# Patient Record
Sex: MALE | Race: BLACK OR AFRICAN AMERICAN | Employment: UNEMPLOYED | URBAN - METROPOLITAN AREA
[De-identification: names, ages, dates, MRNs, and addresses within clinical notes are randomized per-mention and may not be internally consistent; named-entity substitution may affect disease eponyms.]

---

## 2018-03-26 ENCOUNTER — OFFICE VISIT (OUTPATIENT)
Dept: INTERNAL MEDICINE CLINIC | Age: 23
End: 2018-03-26

## 2018-03-26 VITALS
HEIGHT: 69 IN | HEART RATE: 58 BPM | BODY MASS INDEX: 29.98 KG/M2 | SYSTOLIC BLOOD PRESSURE: 125 MMHG | DIASTOLIC BLOOD PRESSURE: 72 MMHG | OXYGEN SATURATION: 98 % | WEIGHT: 202.38 LBS | TEMPERATURE: 99.6 F | RESPIRATION RATE: 18 BRPM

## 2018-03-26 DIAGNOSIS — L85.3 XEROSIS OF SKIN: ICD-10-CM

## 2018-03-26 DIAGNOSIS — Z00.00 PREVENTATIVE HEALTH CARE: Primary | ICD-10-CM

## 2018-03-26 RX ORDER — TRIAMCINOLONE ACETONIDE 0.25 MG/G
CREAM TOPICAL 2 TIMES DAILY
Qty: 30 G | Refills: 0 | Status: SHIPPED | OUTPATIENT
Start: 2018-03-26 | End: 2018-05-08 | Stop reason: ALTCHOICE

## 2018-03-26 RX ORDER — BISMUTH SUBSALICYLATE 262 MG
1 TABLET,CHEWABLE ORAL DAILY
COMMUNITY

## 2018-03-26 NOTE — LETTER
NOTIFICATION RETURN TO WORK / SCHOOL 
 
3/26/2018 2:04 PM 
 
Mr. Mariajose Cook [address] [address] To Whom It May Concern: 
 
Mariajose Cook is currently under the care of [address] [place] [address],[address]. He was seen today in the office. If there are questions or concerns please have the patient contact our office. Sincerely, Cj Arriaga MD

## 2018-03-26 NOTE — PROGRESS NOTES
HISTORY OF PRESENT ILLNESS  Fabio Fang is a 23 y.o. male. HPI  new to my practice  Transferring care from Dr. Bertrand Barnes. Last evaluated there 2  years ago. Previous medical records are not available for my review at this visit. Fabio Fang is here for complete health maintenance physical exam and screening. he does have other concerns. He reports over 1 year history of waxing/ waning distal penile shaft dry sometimes pruritic skin. No blisters, ulcers, redness, drainage. No discharge. History   Sexual Activity    Sexual activity: Yes    Partners: Female         Health maintenance hx includes:  Exercise: very active. 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 Immunization status: up to date and documented. Social History     Social History    Marital status: UNKNOWN     Spouse name: N/A    Number of children: N/A    Years of education: N/A     Occupational History    Not on file.      Social History Main Topics    Smoking status: Never Smoker    Smokeless tobacco: Never Used    Alcohol use Yes      Comment: 0-1 drinks a month    Drug use: 1.00 per week     Special: Marijuana      Comment: 1-2  a week     Sexual activity: Yes     Partners: Female     Other Topics Concern    Not on file     Social History Narrative    No narrative on file     Past Surgical History:   Procedure Laterality Date    HX ORTHOPAEDIC Left 2016    Left Shoulder - labrum     Family History   Problem Relation Age of Onset    Hypertension Father     Hypertension Brother     Diabetes Paternal Grandmother    [employer] Paternal Aunt      gallbladder    Cancer Maternal Grandfather      No current outpatient prescriptions on file prior to visit. No current facility-administered medications on file prior to visit. .    Review of Systems   Constitutional: Negative for malaise/fatigue and weight loss. Eyes: Negative for blurred vision and pain. Respiratory: Negative for cough, shortness of breath and wheezing. Cardiovascular: Negative for chest pain, palpitations and leg swelling. Gastrointestinal: Negative for abdominal pain, constipation, diarrhea, heartburn, nausea and vomiting. Genitourinary: Negative. As above     Musculoskeletal: Negative for back pain, joint pain and myalgias. Skin: Negative for rash. Neurological: Negative for dizziness and headaches. Endo/Heme/Allergies: Negative for environmental allergies. Psychiatric/Behavioral: Positive for substance abuse (he occasionally uses THC). Negative for depression. The patient is not nervous/anxious and does not have insomnia. Physical Exam   Constitutional: He is oriented to person, place, and time. He appears well-developed and well-nourished. No distress. Body mass index is 29.89 kg/(m^2). /72 (BP 1 Location: Left arm, BP Patient Position: Sitting)  Pulse (!) 58  Temp 99.6 °F (37.6 °C) (Oral)   Resp 18  Ht 5' 9\" (1.753 m)  Wt 202 lb 6 oz (91.8 kg)  SpO2 98%  BMI 29.89 kg/m2   HENT:   Head: Normocephalic and atraumatic. Right Ear: Hearing normal.   Left Ear: Hearing normal.   Nose: Nose normal.   Mouth/Throat: Oropharynx is clear and moist and mucous membranes are normal. Normal dentition. Eyes: Conjunctivae and lids are normal. Pupils are equal, round, and reactive to light. Right eye exhibits no discharge. Left eye exhibits no discharge. No scleral icterus. Neck: Trachea normal. No thyromegaly present.    Cardiovascular: Normal rate, regular rhythm, normal heart sounds, intact distal pulses and normal pulses. Exam reveals no gallop and no friction rub. No murmur heard. Pulmonary/Chest: Effort normal and breath sounds normal. No respiratory distress. Abdominal: Soft. Normal appearance and bowel sounds are normal. He exhibits no distension and no mass. There is no hepatosplenomegaly. There is no tenderness. There is no CVA tenderness. Genitourinary:       Circumcised. No penile erythema or penile tenderness. No discharge found. Genitourinary Comments: Mild dorsal penile dryness over shaft. No vesicles, ulcerations, redness, drainage. Musculoskeletal: Normal range of motion. He exhibits no edema or tenderness. Lymphadenopathy:     He has no cervical adenopathy. Right: No inguinal adenopathy present. Left: No inguinal adenopathy present. Neurological: He is alert and oriented to person, place, and time. Skin: Skin is warm and dry. No rash noted. He is not diaphoretic. Psychiatric: He has a normal mood and affect. His speech is normal and behavior is normal. Judgment and thought content normal. Cognition and memory are normal.   Nursing note and vitals reviewed. ASSESSMENT and PLAN  Diagnoses and all orders for this visit:    1. Preventative health care  Marissa  was counseled on age-appropriate/ guideline-based risk prevention behaviors and screening for a 23y.o. year old   male . We also discussed adjustments in screening based on family history if necessary. Printed instructions for preventative screening guidelines and healthy behaviors given to patient with after visit summary. 2. Xerosis of skin  -     triamcinolone acetonide (KENALOG) 0.025 % topical cream; Apply  to affected area two (2) times a day. use thin layer      Follow-up Disposition:  Return in about 1 year (around 3/26/2019).

## 2018-03-26 NOTE — MR AVS SNAPSHOT
[facility] 
 
 
 [address] [place] [address] [facility] 
[phone] Patient: Luis Ennis MRN: [id number] :1995 Visit Information Date & Time Provider Department Dept. Phone Encounter #  
 3/26/2018  1:30 PM Sunni Alegria MD [facility] [address] [facility] [id number] Follow-up Instructions Return in about 1 year (around 3/26/2019). Upcoming Health Maintenance Date Due Hepatitis A Peds Age 1-18 (1 of 2 - Standard Series) 1998 DTaP/Tdap/Td series (1 - Tdap) 2004 HPV AGE 9Y-34Y (1 of 3 - Male 3 Dose Series) 2008 Influenza Age 5 to Adult 2017 Allergies as of 3/26/2018  Review Complete On: 3/26/2018 By: Sunni Alegria MD  
 No Known Allergies Current Immunizations  Reviewed on 3/26/2018 No immunizations on file. Reviewed by Sunni Alegria MD on 3/26/2018 at  1:51 PM  
You Were Diagnosed With   
  
 Codes Comments Preventative health care    -  Primary ICD-10-CM: Z00.00 ICD-9-CM: V70.0 Xerosis of skin     ICD-10-CM: L85.3 ICD-9-CM: 706.8 Vitals BP Pulse Temp Resp Height(growth percentile) Weight(growth percentile) 125/72 (BP 1 Location: Left arm, BP Patient Position: Sitting) (!) 58 99.6 °F (37.6 °C) (Oral) 18 5' 9\" (1.753 m) 202 lb 6 oz (91.8 kg) SpO2 BMI Smoking Status 98% 29.89 kg/m2 Never Smoker Vitals History BMI and BSA Data Body Mass Index Body Surface Area  
 29.89 kg/m 2 2.11 m 2 Preferred Pharmacy Pharmacy Name Phone [facility]/PHARMACY #[id number]- [address] [facility] AT [place] [address] [phone] Your Updated Medication List  
  
   
This list is accurate as of 3/26/18  2:04 PM.  Always use your most recent med list.  
  
  
  
  
 multivitamin tablet Commonly known as:  ONE A DAY Take 1 Tab by mouth daily. triamcinolone acetonide 0.025 % topical cream  
Commonly known as:  KENALOG Apply  to affected area two (2) times a day. use thin layer Prescriptions Sent to Pharmacy Refills  
 triamcinolone acetonide (KENALOG) 0.025 % topical cream 0 Sig: Apply  to affected area two (2) times a day. use thin layer Class: Normal  
 Pharmacy: [facility]/pharmacy #[id number]- [place], [address] [facility] #: [phone] Route: Topical  
  
Follow-up Instructions Return in about 1 year (around 3/26/2019). Patient Instructions Well Visit, Ages 25 to 48: Care Instructions Your Care Instructions Physical exams can help you stay healthy. Your doctor has checked your overall health and may have suggested ways to take good care of yourself. He or she also may have recommended tests. At home, you can help prevent illness with healthy eating, regular exercise, and other steps. Follow-up care is a key part of your treatment and safety. Be sure to make and go to all appointments, and call your doctor if you are having problems. It's also a good idea to know your test results and keep a list of the medicines you take. How can you care for yourself at home? · Reach and stay at a healthy weight. This will lower your risk for many problems, such as obesity, diabetes, heart disease, and high blood pressure. · Get at least 30 minutes of physical activity on most days of the week. Walking is a good choice. You also may want to do other activities, such as running, swimming, cycling, or playing tennis or team sports. Discuss any changes in your exercise program with your doctor. · Do not smoke or allow others to smoke around you. If you need help quitting, talk to your doctor about stop-smoking programs and medicines. These can increase your chances of quitting for good.  
· Talk to your doctor about whether you have any risk factors for sexually transmitted infections (STIs). Having one sex partner (who does not have STIs and does not have sex with anyone else) is a good way to avoid these infections. · Use birth control if you do not want to have children at this time. Talk with your doctor about the choices available and what might be best for you. · Protect your skin from too much sun. When you're outdoors from 10 a.m. to 4 p.m., stay in the shade or cover up with clothing and a hat with a wide brim. Wear sunglasses that block UV rays. Even when it's cloudy, put broad-spectrum sunscreen (SPF 30 or higher) on any exposed skin. · See a dentist one or two times a year for checkups and to have your teeth cleaned. · Wear a seat belt in the car. · Drink alcohol in moderation, if at all. That means no more than 2 drinks a day for men and 1 drink a day for women. Follow your doctor's advice about when to have certain tests. These tests can spot problems early. For everyone · Cholesterol. Have the fat (cholesterol) in your blood tested after age 21. Your doctor will tell you how often to have this done based on your age, family history, or other things that can increase your risk for heart disease. · Blood pressure. Have your blood pressure checked during a routine doctor visit. Your doctor will tell you how often to check your blood pressure based on your age, your blood pressure results, and other factors. · Vision. Talk with your doctor about how often to have a glaucoma test. 
· Diabetes. Ask your doctor whether you should have tests for diabetes. · Colon cancer. Have a test for colon cancer at age 48. You may have one of several tests. If you are younger than 48, you may need a test earlier if you have any risk factors. Risk factors include whether you already had a precancerous polyp removed from your colon or whether your parent, brother, sister, or child has had colon cancer. For women · Breast exam and mammogram. Talk to your doctor about when you should have a clinical breast exam and a mammogram. Medical experts differ on whether and how often women under 50 should have these tests. Your doctor can help you decide what is right for you. · Pap test and pelvic exam. Begin Pap tests at age 24. A Pap test is the best way to find cervical cancer. The test often is part of a pelvic exam. Ask how often to have this test. 
· Tests for sexually transmitted infections (STIs). Ask whether you should have tests for STIs. You may be at risk if you have sex with more than one person, especially if your partners do not wear condoms. For men · Tests for sexually transmitted infections (STIs). Ask whether you should have tests for STIs. You may be at risk if you have sex with more than one person, especially if you do not wear a condom. · Testicular cancer exam. Ask your doctor whether you should check your testicles regularly. · Prostate exam. Talk to your doctor about whether you should have a blood test (called a PSA test) for prostate cancer. Experts differ on whether and when men should have this test. Some experts suggest it if you are older than 39 and are -American or have a father or brother who got prostate cancer when he was younger than 72. When should you call for help? Watch closely for changes in your health, and be sure to contact your doctor if you have any problems or symptoms that concern you. Where can you learn more? Go to http://renetta-marco.info/. Enter P072 in the search box to learn more about \"Well Visit, Ages 25 to 48: Care Instructions. \" Current as of: May 12, 2017 Content Version: 11.4 © 6017-0848 Healthwise, Incorporated. Care instructions adapted under license by [employer] (which disclaims liability or warranty for this information).  If you have questions about a medical condition or this instruction, always ask your healthcare professional. [address]  any warranty or liability for your use of this information. Introducing [facility]! [facility] introduces [employer] patient portal. Now you can access parts of your medical record, email your doctor's office, and request medication refills online. 1. In your internet browser, go to [email or web address] 2. Click on the First Time User? Click Here link in the Sign In box. You will see the New Member Sign Up page. 3. Enter your [employer] Access Code exactly as it appears below. You will not need to use this code after youve completed the sign-up process. If you do not sign up before the expiration date, you must request a new code. · [employer] Access Code: [email or web address] 
Expires: 6/24/2018  2:04 PM 
 
4. Enter the last four digits of your Social Security Number (xxxx) and Date of Birth (mm/dd/yyyy) as indicated and click Submit. You will be taken to the next sign-up page. 5. Create a [employer] ID. This will be your [employer] login ID and cannot be changed, so think of one that is secure and easy to remember. 6. Create a [employer] password. You can change your password at any time. 7. Enter your Password Reset Question and Answer. This can be used at a later time if you forget your password. 8. Enter your e-mail address. You will receive e-mail notification when new information is available in [address]. 9. Click Sign Up. You can now view and download portions of your medical record. 10. Click the Download Summary menu link to download a portable copy of your medical information. If you have questions, please visit the Frequently Asked Questions section of the [employer] website. Remember, [employer] is NOT to be used for urgent needs. For medical emergencies, dial 911. Now available from your iPhone and Android! Please provide this summary of care documentation to your next provider. Your primary care clinician is listed as NONE. If you have any questions after today's visit, please call [phone].

## 2018-03-26 NOTE — PATIENT INSTRUCTIONS

## 2018-04-03 ENCOUNTER — DOCUMENTATION ONLY (OUTPATIENT)
Dept: INTERNAL MEDICINE CLINIC | Age: 23
End: 2018-04-03

## 2018-05-08 ENCOUNTER — OFFICE VISIT (OUTPATIENT)
Dept: INTERNAL MEDICINE CLINIC | Age: 23
End: 2018-05-08

## 2018-05-08 VITALS
OXYGEN SATURATION: 99 % | TEMPERATURE: 98.5 F | RESPIRATION RATE: 18 BRPM | HEIGHT: 69 IN | HEART RATE: 77 BPM | WEIGHT: 201.5 LBS | SYSTOLIC BLOOD PRESSURE: 141 MMHG | DIASTOLIC BLOOD PRESSURE: 75 MMHG | BODY MASS INDEX: 29.84 KG/M2

## 2018-05-08 DIAGNOSIS — K64.9 HEMORRHOIDS, UNSPECIFIED HEMORRHOID TYPE: ICD-10-CM

## 2018-05-08 RX ORDER — TRIAMCINOLONE ACETONIDE 0.25 MG/G
CREAM TOPICAL 2 TIMES DAILY
Qty: 30 G | Refills: 0
Start: 2018-05-08

## 2018-05-08 NOTE — PROGRESS NOTES
HISTORY OF PRESENT ILLNESS  Gray Blair is a 23 y.o. male. HPI  Gastrointestinal History:     he has complaint of  diarrhea without abdominal pain, cramping, vomiting, fever, chills, sweats and arthralgias for 3 days starting 4 days ago. Now constipated over last 24 hours. Abdominal pain is located in none. Orthostatic dizziness is not  present. Contacts with similar GI infections:  No  Medications tried:  none   he is able to get liquids down. Also having some anal discomfort, swelling. Trace blood on TP  . There is no problem list on file for this patient. No past medical history on file. No Known Allergies  Current Outpatient Prescriptions on File Prior to Visit   Medication Sig Dispense Refill    multivitamin (ONE A DAY) tablet Take 1 Tab by mouth daily. No current facility-administered medications on file prior to visit. Social History   Substance Use Topics    Smoking status: Never Smoker    Smokeless tobacco: Never Used    Alcohol use Yes      Comment: 0-1 drinks a month         ROS    Physical Exam   Constitutional: He appears well-developed and well-nourished. No distress. /75 (BP 1 Location: Left arm, BP Patient Position: Sitting)  Pulse 77  Temp 98.5 °F (36.9 °C) (Oral)   Resp 18  Ht 5' 9\" (1.753 m)  Wt 201 lb 8 oz (91.4 kg)  SpO2 99%  BMI 29.76 kg/m2Body mass index is 29.76 kg/(m^2). HENT:   Mouth/Throat: Oropharynx is clear and moist.   Eyes: No scleral icterus. Neck: No JVD present. Carotid bruit is not present. Cardiovascular: Normal rate, regular rhythm, normal heart sounds and intact distal pulses. Pulmonary/Chest: Effort normal and breath sounds normal.   Abdominal: Soft. Normal appearance and bowel sounds are normal. He exhibits no distension and no mass. There is no hepatosplenomegaly. There is no tenderness. There is no rigidity and no guarding. Genitourinary:         Genitourinary Comments: 1 cm soft ext hemorrhoid, mildly tender. Nonbleeding. Musculoskeletal: He exhibits no edema. Neurological: He is alert. Skin: Skin is warm and dry. He is not diaphoretic. Nursing note and vitals reviewed. ASSESSMENT and PLAN  Diagnoses and all orders for this visit:    1. Food poisoning, accidental or unintentional, initial encounter -already improved  [employer]. The patient was given written instructions detailing his diagnoses and recommendations made today at the visit. 2. Hemorrhoids, unspecified hemorrhoid type  -     triamcinolone acetonide (KENALOG) 0.025 % topical cream; Apply  to affected area two (2) times a day. use thin layer  The patient was given written instructions detailing his diagnoses and recommendations made today at the visit. Follow-up Disposition:  Return if symptoms worsen or fail to improve.

## 2018-05-08 NOTE — PATIENT INSTRUCTIONS
Hemorrhoids: Care Instructions  Your Care Instructions    Hemorrhoids are enlarged veins that develop in the anal canal. Bleeding during bowel movements, itching, swelling, and rectal pain are the most common symptoms. They can be uncomfortable at times, but hemorrhoids rarely are a serious problem. You can treat most hemorrhoids with simple changes to your diet and bowel habits. These changes include eating more fiber and not straining to pass stools. Most hemorrhoids do not need surgery or other treatment unless they are very large and painful or bleed a lot. Follow-up care is a key part of your treatment and safety. Be sure to make and go to all appointments, and call your doctor if you are having problems. It's also a good idea to know your test results and keep a list of the medicines you take. How can you care for yourself at home? · Sit in a few inches of warm water (sitz bath) 3 times a day and after bowel movements. The warm water helps with pain and itching. · Put ice on your anal area several times a day for 10 minutes at a time. Put a thin cloth between the ice and your skin. Follow this by placing a warm, wet towel on the area for another 10 to 20 minutes. · Take pain medicines exactly as directed. ¨ If the doctor gave you a prescription medicine for pain, take it as prescribed. ¨ If you are not taking a prescription pain medicine, ask your doctor if you can take an over-the-counter medicine. · Keep the anal area clean, but be gentle. Use water and a fragrance-free soap, such as [place], or use baby wipes or medicated pads, such as Tucks. · Wear cotton underwear and loose clothing to decrease moisture in the anal area. · Eat more fiber. Include foods such as whole-grain breads and cereals, raw vegetables, raw and dried fruits, and beans. · Drink plenty of fluids, enough so that your urine is light yellow or clear like water.  If you have kidney, heart, or liver disease and have to limit fluids, talk with your doctor before you increase the amount of fluids you drink. · Use a stool softener that contains bran or psyllium. You can save money by buying bran or psyllium (available in bulk at most health food stores) and sprinkling it on foods or stirring it into fruit juice. Or you can use a product such as Metamucil or Hydrocil. · Practice healthy bowel habits. ¨ Go to the bathroom as soon as you have the urge. ¨ Avoid straining to pass stools. Relax and give yourself time to let things happen naturally. ¨ Do not hold your breath while passing stools. ¨ Do not read while sitting on the toilet. Get off the toilet as soon as you have finished. · Take your medicines exactly as prescribed. Call your doctor if you think you are having a problem with your medicine. When should you call for help? Call 911 anytime you think you may need emergency care. For example, call if:  ? · You pass maroon or very bloody stools. ?Call your doctor now or seek immediate medical care if:  ? · You have increased pain. ? · You have increased bleeding. ? Watch closely for changes in your health, and be sure to contact your doctor if:  ? · Your symptoms have not improved after 3 or 4 days. Where can you learn more? Go to http://renetta-marco.info/. Enter F228 in the search box to learn more about \"Hemorrhoids: Care Instructions. \"  Current as of: May 12, 2017  Content Version: 11.4  © 9886-2904 [employer]. Care instructions adapted under license by [employer] (which disclaims liability or warranty for this information). If you have questions about a medical condition or this instruction, always ask your healthcare professional. [place] [address] any warranty or liability for your use of this information. Food Poisoning: Care Instructions  Your Care Instructions    Food poisoning occurs when you eat foods that contain harmful germs.  Food can be contaminated while it is growing, during processing, or when it is prepared. Fresh fruits and vegetables also can be contaminated if they are washed in contaminated water. You may have become ill after eating undercooked meat or eggs or other unsafe foods. Cooking foods thoroughly and storing them properly can help prevent food poisoning. There are many types of food poisoning. Your symptoms depend on the type of food poisoning you have. You will probably begin to feel better in 1 or 2 days. In the meantime, get plenty of rest and make sure that you do not become dehydrated. Follow-up care is a key part of your treatment and safety. Be sure to make and go to all appointments, and call your doctor if you are having problems. It's also a good idea to know your test results and keep a list of the medicines you take. How can you care for yourself at home? · To prevent dehydration, drink plenty of fluids. Choose water and other caffeine-free clear liquids until you feel better. If you have kidney, heart, or liver disease and have to limit fluids, talk with your doctor before you increase the amount of fluids you drink. · Begin eating small amounts of mild, low-fat foods, depending on how you feel. Try foods like rice, dry crackers, bananas, and applesauce. ¨ Avoid spicy foods, alcohol, and coffee until 48 hours after all symptoms have disappeared. ¨ Avoid chewing gum that contains sorbitol. ¨ Avoid dairy products for 3 days after symptoms disappear. · Take your medicines as prescribed. Call your doctor if you think you are having a problem with your medicine. You will get more details on the specific medicines your doctor prescribes. To prevent food poisoning  · Keep hot foods hot and cold foods cold. · Do not eat meats, dressings, salads, or other foods that have been kept at room temperature for more than 2 hours. · Use a thermometer to check your refrigerator.  It should be between 34°F and 40°F.  · Defrost meats in the refrigerator or microwave, not on the kitchen counter. · Keep your hands and your kitchen clean. Wash your hands, cutting boards, and countertops with hot, soapy water. If you use the same cutting board for chopping vegetables and preparing raw meat, be sure to wash the cutting board with hot, soapy water between each use. · Cook meat until it is well done. · Do not eat raw eggs or uncooked dough or sauces made with raw eggs. · Do not take chances. If you think food looks or tastes spoiled, throw it out. When should you call for help? Call 911 anytime you think you may need emergency care. For example, call if:  ? · You passed out (lost consciousness). ?Call your doctor now or seek immediate medical care if:  ? · You have new or worse belly pain. ? · You have a new or higher fever. ? · You are dizzy or lightheaded, or you feel like you may faint. ? · You have symptoms of dehydration, such as:  ¨ Dry eyes and a dry mouth. ¨ Passing only a little urine. ¨ Feeling thirstier than normal.   ? · You cannot keep down medicine or fluids. ? · You have new or more blood in stools. ? · You have new or worse vomiting or diarrhea. ? Watch closely for changes in your health, and be sure to contact your doctor if:  ? · You do not get better as expected. Where can you learn more? Go to http://renetta-marco.info/. Enter B338 in the search box to learn more about \"Food Poisoning: Care Instructions. \"  Current as of: March 3, 2017  Content Version: 11.4  © 0181-3395 [employer]. Care instructions adapted under license by [employer] (which disclaims liability or warranty for this information). If you have questions about a medical condition or this instruction, always ask your healthcare professional. [address] any warranty or liability for your use of this information.

## 2018-05-08 NOTE — MR AVS SNAPSHOT
[facility] 
 
 
 [address] [place] [address] [facility] 
[phone] Patient: Nohemi Zhang MRN: [id number] :1995 Visit Information Date & Time Provider Department Dept. Phone Encounter #  
 2018  2:15 PM Marycarmen Ng MD [facility] [address] [id number] Follow-up Instructions Return if symptoms worsen or fail to improve. Upcoming Health Maintenance Date Due Hepatitis A Peds Age 1-18 (1 of 2 - Standard Series) 1998 DTaP/Tdap/Td series (1 - Tdap) 2004 HPV Age 9Y-34Y (1 of 1 - Male 3 Dose Series) 2008 Influenza Age 5 to Adult 2018 Allergies as of 2018  Review Complete On: 3/26/2018 By: Marycarmen Ng MD  
 No Known Allergies Current Immunizations  Reviewed on 3/26/2018 No immunizations on file. Not reviewed this visit You Were Diagnosed With   
  
 Codes Comments Food poisoning, accidental or unintentional, initial encounter    -  Primary ICD-10-CM: T62.91XA ICD-9-CM: 988.9, E865.9 Hemorrhoids, unspecified hemorrhoid type     ICD-10-CM: K64.9 ICD-9-CM: 732. 6 Vitals BP Pulse Temp Resp Height(growth percentile) Weight(growth percentile) 141/75 (BP 1 Location: Left arm, BP Patient Position: Sitting) 77 98.5 °F (36.9 °C) (Oral) 18 5' 9\" (1.753 m) 201 lb 8 oz (91.4 kg) SpO2 BMI Smoking Status 99% 29.76 kg/m2 Never Smoker Vitals History BMI and BSA Data Body Mass Index Body Surface Area  
 29.76 kg/m 2 2.11 m 2 Preferred Pharmacy Pharmacy Name Phone [id number] [address] [phone] Your Updated Medication List  
  
   
This list is accurate as of 18  2:51 PM.  Always use your most recent med list.  
  
  
  
  
 multivitamin tablet Commonly known as:  ONE A DAY Take 1 Tab by mouth daily. triamcinolone acetonide 0.025 % topical cream  
Commonly known as:  KENALOG Apply  to affected area two (2) times a day. use thin layer Follow-up Instructions Return if symptoms worsen or fail to improve. Patient Instructions Hemorrhoids: Care Instructions Your Care Instructions Hemorrhoids are enlarged veins that develop in the anal canal. Bleeding during bowel movements, itching, swelling, and rectal pain are the most common symptoms. They can be uncomfortable at times, but hemorrhoids rarely are a serious problem. You can treat most hemorrhoids with simple changes to your diet and bowel habits. These changes include eating more fiber and not straining to pass stools. Most hemorrhoids do not need surgery or other treatment unless they are very large and painful or bleed a lot. Follow-up care is a key part of your treatment and safety. Be sure to make and go to all appointments, and call your doctor if you are having problems. It's also a good idea to know your test results and keep a list of the medicines you take. How can you care for yourself at home? · Sit in a few inches of warm water (sitz bath) 3 times a day and after bowel movements. The warm water helps with pain and itching. · Put ice on your anal area several times a day for 10 minutes at a time. Put a thin cloth between the ice and your skin. Follow this by placing a warm, wet towel on the area for another 10 to 20 minutes. · Take pain medicines exactly as directed. ¨ If the doctor gave you a prescription medicine for pain, take it as prescribed. ¨ If you are not taking a prescription pain medicine, ask your doctor if you can take an over-the-counter medicine. · Keep the anal area clean, but be gentle. Use water and a fragrance-free soap, such as [place], or use baby wipes or medicated pads, such as Tucks. · Wear cotton underwear and loose clothing to decrease moisture in the anal area. · Eat more fiber. Include foods such as whole-grain breads and cereals, raw vegetables, raw and dried fruits, and beans. · Drink plenty of fluids, enough so that your urine is light yellow or clear like water. If you have kidney, heart, or liver disease and have to limit fluids, talk with your doctor before you increase the amount of fluids you drink. · Use a stool softener that contains bran or psyllium. You can save money by buying bran or psyllium (available in bulk at most health food stores) and sprinkling it on foods or stirring it into fruit juice. Or you can use a product such as Metamucil or Hydrocil. · Practice healthy bowel habits. ¨ Go to the bathroom as soon as you have the urge. ¨ Avoid straining to pass stools. Relax and give yourself time to let things happen naturally. ¨ Do not hold your breath while passing stools. ¨ Do not read while sitting on the toilet. Get off the toilet as soon as you have finished. · Take your medicines exactly as prescribed. Call your doctor if you think you are having a problem with your medicine. When should you call for help? Call 911 anytime you think you may need emergency care. For example, call if: 
? · You pass maroon or very bloody stools. ?Call your doctor now or seek immediate medical care if: 
? · You have increased pain. ? · You have increased bleeding. ? Watch closely for changes in your health, and be sure to contact your doctor if: 
? · Your symptoms have not improved after 3 or 4 days. Where can you learn more? Go to http://renetta-marco.info/. Enter F228 in the search box to learn more about \"Hemorrhoids: Care Instructions. \" Current as of: May 12, 2017 Content Version: 11.4 © 6702-9822 [employer]. Care instructions adapted under license by [employer] (which disclaims liability or warranty for this information).  If you have questions about a medical condition or this instruction, always ask your healthcare professional. [address] any warranty or liability for your use of this information. Food Poisoning: Care Instructions Your Care Instructions Food poisoning occurs when you eat foods that contain harmful germs. Food can be contaminated while it is growing, during processing, or when it is prepared. Fresh fruits and vegetables also can be contaminated if they are washed in contaminated water. You may have become ill after eating undercooked meat or eggs or other unsafe foods. Cooking foods thoroughly and storing them properly can help prevent food poisoning. There are many types of food poisoning. Your symptoms depend on the type of food poisoning you have. You will probably begin to feel better in 1 or 2 days. In the meantime, get plenty of rest and make sure that you do not become dehydrated. Follow-up care is a key part of your treatment and safety. Be sure to make and go to all appointments, and call your doctor if you are having problems. It's also a good idea to know your test results and keep a list of the medicines you take. How can you care for yourself at home? · To prevent dehydration, drink plenty of fluids. Choose water and other caffeine-free clear liquids until you feel better. If you have kidney, heart, or liver disease and have to limit fluids, talk with your doctor before you increase the amount of fluids you drink. · Begin eating small amounts of mild, low-fat foods, depending on how you feel. Try foods like rice, dry crackers, bananas, and applesauce. ¨ Avoid spicy foods, alcohol, and coffee until 48 hours after all symptoms have disappeared. ¨ Avoid chewing gum that contains sorbitol. ¨ Avoid dairy products for 3 days after symptoms disappear. · Take your medicines as prescribed. Call your doctor if you think you are having a problem with your medicine.  You will get more details on the specific medicines your doctor prescribes. To prevent food poisoning · Keep hot foods hot and cold foods cold. · Do not eat meats, dressings, salads, or other foods that have been kept at room temperature for more than 2 hours. · Use a thermometer to check your refrigerator. It should be between 34°F and 40°F. 
· Defrost meats in the refrigerator or microwave, not on the kitchen counter. · Keep your hands and your kitchen clean. Wash your hands, cutting boards, and countertops with hot, soapy water. If you use the same cutting board for chopping vegetables and preparing raw meat, be sure to wash the cutting board with hot, soapy water between each use. · Cook meat until it is well done. · Do not eat raw eggs or uncooked dough or sauces made with raw eggs. · Do not take chances. If you think food looks or tastes spoiled, throw it out. When should you call for help? Call 911 anytime you think you may need emergency care. For example, call if: 
? · You passed out (lost consciousness). ?Call your doctor now or seek immediate medical care if: 
? · You have new or worse belly pain. ? · You have a new or higher fever. ? · You are dizzy or lightheaded, or you feel like you may faint. ? · You have symptoms of dehydration, such as: ¨ Dry eyes and a dry mouth. ¨ Passing only a little urine. ¨ Feeling thirstier than normal.  
? · You cannot keep down medicine or fluids. ? · You have new or more blood in stools. ? · You have new or worse vomiting or diarrhea. ? Watch closely for changes in your health, and be sure to contact your doctor if: 
? · You do not get better as expected. Where can you learn more? Go to http://renetta-marco.info/. Enter E015 in the search box to learn more about \"Food Poisoning: Care Instructions. \" Current as of: March 3, 2017 Content Version: 11.4 © 7730-3114 [employer].  Care instructions adapted under license by [address] (which disclaims liability or warranty for this information). If you have questions about a medical condition or this instruction, always ask your healthcare professional. [address] any warranty or liability for your use of this information. Introducing [facility]! [employer] introduces SellStage patient portal. Now you can access parts of your medical record, email your doctor's office, and request medication refills online. 1. In your internet browser, go to [email or web address] 2. Click on the First Time User? Click Here link in the Sign In box. You will see the New Member Sign Up page. 3. Enter your SellStage Access Code exactly as it appears below. You will not need to use this code after youve completed the sign-up process. If you do not sign up before the expiration date, you must request a new code. · SellStage Access Code: [email or web address] 
Expires: 6/24/2018  2:04 PM 
 
4. Enter the last four digits of your Social Security Number (xxxx) and Date of Birth (mm/dd/yyyy) as indicated and click Submit. You will be taken to the next sign-up page. 5. Create a SellStage ID. This will be your SellStage login ID and cannot be changed, so think of one that is secure and easy to remember. 6. Create a SellStage password. You can change your password at any time. 7. Enter your Password Reset Question and Answer. This can be used at a later time if you forget your password. 8. Enter your e-mail address. You will receive e-mail notification when new information is available in [address] 9. Click Sign Up. You can now view and download portions of your medical record. 10. Click the Download Summary menu link to download a portable copy of your medical information. If you have questions, please visit the Frequently Asked Questions section of the SellStage website.  Remember, SellStage is NOT to be used for urgent needs. For medical emergencies, dial 911. Now available from your iPhone and Android! Please provide this summary of care documentation to your next provider. Your primary care clinician is listed as NONE. If you have any questions after today's visit, please call [phone].